# Patient Record
Sex: FEMALE | Race: OTHER
[De-identification: names, ages, dates, MRNs, and addresses within clinical notes are randomized per-mention and may not be internally consistent; named-entity substitution may affect disease eponyms.]

---

## 2017-09-15 ENCOUNTER — HOSPITAL ENCOUNTER (EMERGENCY)
Dept: HOSPITAL 72 - EMR | Age: 57
Discharge: HOME | End: 2017-09-15
Payer: MEDICAID

## 2017-09-15 VITALS — SYSTOLIC BLOOD PRESSURE: 129 MMHG | DIASTOLIC BLOOD PRESSURE: 93 MMHG

## 2017-09-15 VITALS — DIASTOLIC BLOOD PRESSURE: 69 MMHG | SYSTOLIC BLOOD PRESSURE: 118 MMHG

## 2017-09-15 VITALS — BODY MASS INDEX: 26.66 KG/M2 | WEIGHT: 160 LBS | HEIGHT: 65 IN

## 2017-09-15 VITALS — SYSTOLIC BLOOD PRESSURE: 117 MMHG | DIASTOLIC BLOOD PRESSURE: 62 MMHG

## 2017-09-15 DIAGNOSIS — N13.30: Primary | ICD-10-CM

## 2017-09-15 DIAGNOSIS — M47.9: ICD-10-CM

## 2017-09-15 DIAGNOSIS — Z90.710: ICD-10-CM

## 2017-09-15 LAB
ALBUMIN/GLOB SERPL: 1.9 {RATIO} (ref 1–2.7)
ALT SERPL-CCNC: 14 U/L (ref 3–33)
ANION GAP SERPL CALC-SCNC: 13 MMOL/L (ref 5–15)
APPEARANCE UR: CLEAR
AST SERPL-CCNC: 17 U/L (ref 5–40)
BASOPHILS NFR BLD AUTO: 1.3 % (ref 0–2)
CALCIUM SERPL-MCNC: 9.1 MG/DL (ref 8.6–10.2)
CHLORIDE SERPL-SCNC: 100 MEQ/L (ref 98–107)
CO2 SERPL-SCNC: 26 MEQ/L (ref 20–30)
CREAT SERPL-MCNC: 1 MG/DL (ref 0.5–0.9)
EOSINOPHIL NFR BLD AUTO: 1.6 % (ref 0–3)
ERYTHROCYTE [DISTWIDTH] IN BLOOD BY AUTOMATED COUNT: 11.5 % (ref 11.6–14.8)
GFR SERPLBLD BASED ON 1.73 SQ M-ARVRAT: 57.6 ML/MIN (ref 60–?)
GLOBULIN SER-MCNC: 2.2 G/DL
HEMOLYSIS: 4
KETONES UR QL STRIP: NEGATIVE
LEUKOCYTE ESTERASE UR QL STRIP: NEGATIVE
LIPASE SERPL-CCNC: 38 U/L (ref ?–60)
LYMPHOCYTES NFR BLD AUTO: 23.8 % (ref 20–45)
MCH RBC QN AUTO: 32.2 PG (ref 27–31)
MCHC RBC AUTO-ENTMCNC: 34.5 G/DL (ref 32–36)
MCV RBC AUTO: 93 FL (ref 80–99)
MONOCYTES NFR BLD AUTO: 10.6 % (ref 1–10)
NEUTROPHILS NFR BLD AUTO: 62.7 % (ref 45–75)
NITRITE UR QL STRIP: NEGATIVE
PH UR STRIP: 8 [PH] (ref 4.5–8)
PLATELET # BLD: 172 K/UL (ref 150–450)
PMV BLD AUTO: 8.8 FL (ref 6.5–10.1)
POTASSIUM SERPL-SCNC: 3.9 MEQ/L (ref 3.4–4.9)
PROT SERPL-MCNC: 6.5 G/DL (ref 6.6–8.7)
PROT UR QL STRIP: NEGATIVE
RBC # BLD AUTO: 4.36 M/UL (ref 4.2–5.4)
SODIUM SERPL-SCNC: 139 MEQ/L (ref 135–145)
SP GR UR STRIP: 1.01 (ref 1–1.03)
UROBILINOGEN UR-MCNC: NORMAL MG/DL (ref 0–1)
WBC # BLD AUTO: 6.1 K/UL (ref 4.8–10.8)

## 2017-09-15 PROCEDURE — 83690 ASSAY OF LIPASE: CPT

## 2017-09-15 PROCEDURE — 81003 URINALYSIS AUTO W/O SCOPE: CPT

## 2017-09-15 PROCEDURE — 96374 THER/PROPH/DIAG INJ IV PUSH: CPT

## 2017-09-15 PROCEDURE — 80053 COMPREHEN METABOLIC PANEL: CPT

## 2017-09-15 PROCEDURE — 99284 EMERGENCY DEPT VISIT MOD MDM: CPT

## 2017-09-15 PROCEDURE — 74176 CT ABD & PELVIS W/O CONTRAST: CPT

## 2017-09-15 PROCEDURE — 36415 COLL VENOUS BLD VENIPUNCTURE: CPT

## 2017-09-15 PROCEDURE — 85025 COMPLETE CBC W/AUTO DIFF WBC: CPT

## 2017-09-15 PROCEDURE — 96375 TX/PRO/DX INJ NEW DRUG ADDON: CPT

## 2017-09-16 NOTE — DIAGNOSTIC IMAGING REPORT
Indication: Abdominal pain



Technique: Continuous helical transaxial imaging of the abdomen and pelvis was

obtained from the lung bases to the pubic symphysis. No intravenous contrast 

was

administered. Coronal 2-D reformats were also obtained.



Total Dose length Product (DLP):  740 mGycm



CT Dose Index Volume (CTDIvol):   15.2, 0.15 mGy



Comparison: 8/12/16



Findings: There is a persistent dilatation of the left renal pelvis as well as

dilatation of the calyces consistent with hydronephrosis with high 

density

intraluminally, which may be on the basis of recently administered contrast material

or blood. Abrupt transition with a normal caliber ureter again noted at the UPJ.

There is new left perinephric stranding which could be due to obstruction 

or

infection/pyelonephritis. Please correlate clinically. There is no obstructing

stone. The configuration of the dilated renal pelvis and calyces is unchanged from

the last study in 2016.



The right kidney is unremarkable. The urinary bladder is nondistended. Normal

appendix noted. No free fluid or free air identified. Lung bases are clear.

Gallbladder is unremarkable. Uterus is absent. There is narrowing of intervertebral

discs and accompanying endplate osteophyte formation.  Hypertrophied facet joints

also demonstrated.



Impression:



Left perinephric stranding and hydronephrosis secondary to UPJ obstruction 

and/or

pyelonephritis. Note that the hydronephrosis and abrupt transition at the 

UPJ

configuration is unchanged from previous 2016 CT. Development of intraluminal 

high

density may represent blood or recently administered contrast material. 

Clinical

correlation is needed. No obstructing stone identified.



Status post hysterectomy



Spondylosis





The CT scanner at [facility] is accredited by the [employer] College 

of

Radiology and the scans are performed using dose optimization techniques as

appropriate to a performed exam including Automatic Exposure control.

## 2017-09-16 NOTE — EMERGENCY ROOM REPORT
History of Present Illness


General


Chief Complaint:  Back Pain-No Injury


Source:  Patient





Present Illness


HPI


56-year-old female presents ED complaining of flank pain.  States that left 

flank pain started around 12 PM today after she had an MRI with IV contrast 

done as outpatient.  Patient MRI of her head for persistent headaches ordered 

by her PMD.  Patient states right after the IV contrast was administered she 

developed left flank pain.  Pain is throbbing, 8/10, nonradiating.  No other 

aggravating factors.  Denies dysuria or hematuria.  Patient states she's had 

kidney pain in the past and has been there for for the same reason.  Denies any 

other associated symptom


Allergies:  


Coded Allergies:  


     No Known Allergies (Unverified , 12/4/12)





Patient History


Past Medical History:  none


Past Surgical History:  none


Pertinent Family History:  none


Social History:  Denies: smoking, alcohol use, drug use


Pregnant Now:  No


Immunizations:  UTD


Reviewed Nursing Documentation:  PMH: Agreed, PSxH: Agreed





Nursing Documentation-PMH


Hx Cardiac Problems:  Yes





Review of Systems


All Other Systems:  negative except mentioned in HPI





Physical Exam





Vital Signs








  Date Time  Temp Pulse Resp B/P (MAP) Pulse Ox O2 Delivery O2 Flow Rate FiO2


 


9/15/17 19:29 98.4 65 18 125/78 99 Room Air  








Sp02 EP Interpretation:  reviewed, normal


General Appearance:  alert, GCS 15, non-toxic, mild distress


Head:  normocephalic, atraumatic


Eyes:  bilateral eye normal inspection, bilateral eye PERRL


ENT:  hearing grossly normal, normal pharynx, no angioedema, normal voice


Neck:  full range of motion, supple/symm/no masses


Respiratory:  chest non-tender, lungs clear, normal breath sounds, speaking 

full sentences


Cardiovascular #1:  regular rate, rhythm, no edema


Cardiovascular #2:  2+ carotid (R), 2+ carotid (L), 2+ radial (R), 2+ radial (L)

, 2+ dorsalis pedis (R), 2+ dorsalis pedis (L)


Gastrointestinal:  normal bowel sounds, non tender, soft, non-distended, no 

guarding, no rebound


Rectal:  deferred


Genitourinary:  normal inspection, CVA tenderness (L)


Musculoskeletal:  back normal, gait/station normal, normal range of motion, non-

tender


Neurologic:  alert, oriented x3, responsive, motor strength/tone normal, 

sensory intact, speech normal


Psychiatric:  judgement/insight normal, memory normal, mood/affect normal, no 

suicidal/homicidal ideation


Reflexes:  3+ bicep (R), 3+ bicep (L), 3+ tricep (R), 3+ tricep (L), 3+ knee (R)

, 3+ knee (L)


Skin:  normal color, no rash, warm/dry, well hydrated


Lymphatic:  no adenopathy





Medical Decision Making


Diagnostic Impression:  


 Primary Impression:  


 Hydronephrosis of left kidney


 Additional Impression:  


 Flank pain


ER Course


Hospital Course 


56-year-old female presents with left flank pain after contrast administration





Differential diagnosis includes- kidney stone, pyelonephritis, muscle strain





Clinical course


Patient placed on stretcher.  After initial history and physical I ordered labs

, IV fluids, pain medications and CT scan 





Labs - no leukocytosis, electrolytes ok, LFTs normal, UA unremarkable


CT scan shows perinephric stranding around the left kidney consistent with 

contrast administration.  Cannot rule out pyelonephritis





Discussed findings with urologist Dr. Moran; he agrees that with normal 

creatinine and negative UA patient can be discharged.  Recommend followup with 

urology.  Recommend antibiotics and urine culture





I discussed findings with the patient.  She understands the findings





I feel this is a highly complex case requiring extensive working including EKG/

Rhythm strip, Xray/CT/US, Blood/urine lab work, repeat exams while in ED, and 

administration of strong opiates/narcotics for pain control, admission to 

hospital or close patient follow up.  





Diagnosis - hydronephrosis of left kidney, flank pain





Stable and discharged to home with Rx Tylenol #3, Keflex.  Followup with PMD/

urology.  Return to ED if symptoms recur or worsen





Labs








Test


  9/15/17


19:45 9/15/17


19:50


 


Urine Color Pale yellow  


 


Urine Appearance Clear  


 


Urine pH 8 (4.5-8.0)  


 


Urine Specific Gravity


  1.010


(1.005-1.035) 


 


 


Urine Protein


  Negative


(NEGATIVE) 


 


 


Urine Glucose (UA)


  Negative


(NEGATIVE) 


 


 


Urine Ketones


  Negative


(NEGATIVE) 


 


 


Urine Occult Blood


  Negative


(NEGATIVE) 


 


 


Urine Nitrite


  Negative


(NEGATIVE) 


 


 


Urine Bilirubin


  Negative


(NEGATIVE) 


 


 


Urine Urobilinogen


  Normal MG/DL


(0.0-1.0) 


 


 


Urine Leukocyte Esterase


  Negative


(NEGATIVE) 


 


 


White Blood Count


  


  6.1 K/UL


(4.8-10.8)


 


Red Blood Count


  


  4.36 M/UL


(4.20-5.40)


 


Hemoglobin


  


  14.0 G/DL


(12.0-16.0)


 


Hematocrit


  


  40.7 %


(37.0-47.0)


 


Mean Corpuscular Volume  93 FL (80-99) 


 


Mean Corpuscular Hemoglobin


  


  32.2 PG


(27.0-31.0)


 


Mean Corpuscular Hemoglobin


Concent 


  34.5 G/DL


(32.0-36.0)


 


Red Cell Distribution Width


  


  11.5 %


(11.6-14.8)


 


Platelet Count


  


  172 K/UL


(150-450)


 


Mean Platelet Volume


  


  8.8 FL


(6.5-10.1)


 


Neutrophils (%) (Auto)


  


  62.7 %


(45.0-75.0)


 


Lymphocytes (%) (Auto)


  


  23.8 %


(20.0-45.0)


 


Monocytes (%) (Auto)


  


  10.6 %


(1.0-10.0)


 


Eosinophils (%) (Auto)


  


  1.6 %


(0.0-3.0)


 


Basophils (%) (Auto)


  


  1.3 %


(0.0-2.0)


 


Sodium Level


  


  139 mEQ/L


(135-145)


 


Potassium Level


  


  3.9 mEQ/L


(3.4-4.9)


 


Chloride Level


  


  100 mEQ/L


()


 


Carbon Dioxide Level


  


  26 mEQ/L


(20-30)


 


Anion Gap  13 (5-15) 


 


Blood Urea Nitrogen


  


  14 mg/dL


(7-23)


 


Creatinine


  


  1.0 mg/dL


(0.5-0.9)


 


Estimat Glomerular Filtration


Rate 


  57.6 mL/min


(>60)


 


Glucose Level


  


  90 mg/dL


()


 


Calcium Level


  


  9.1 mg/dL


(8.6-10.2)


 


Total Bilirubin


  


  < 0.2 mg/dL


(0.0-1.2)


 


Aspartate Amino Transf


(AST/SGOT) 


  17 U/L (5-40) 


 


 


Alanine Aminotransferase


(ALT/SGPT) 


  14 U/L (3-33) 


 


 


Alkaline Phosphatase


  


  55 U/L


()


 


Total Protein


  


  6.5 g/dL


(6.6-8.7)


 


Albumin


  


  4.3 g/dL


(3.5-5.2)


 


Globulin  2.2 g/dL 


 


Albumin/Globulin Ratio  1.9 (1.0-2.7) 


 


Lipase  38 U/L (< 60) 








CT/MRI/US Diagnostic Results


CT/MRI/US Diagnostic Results :  


   Imaging Test Ordered:  CT A/P


   Impression


perinephric stranding around L kidney c/w contrast administration. cannot rule 

out pyelonephritis





Last Vital Signs








  Date Time  Temp Pulse Resp B/P (MAP) Pulse Ox O2 Delivery O2 Flow Rate FiO2


 


9/15/17 22:35 98.3 65 16 117/62 99 Room Air  








Status:  improved


Disposition:  HOME, SELF-CARE


Condition:  Stable


Scripts


Acetaminophen With Codeine (T#3) (TYLENOL #3 TAB*) Y Tab


1 TAB ORAL Q8H Y for For Pain, #20 TAB


   Prov: MICHAELA SHAFFER M.D.         9/15/17 


Cephalexin* (KEFLEX*) 500 Mg Capsule


500 MG ORAL Q6H, #28 CAP 0 Refills


   Prov: MICHAELA SHAFFER M.D.         9/15/17


Referrals:  


[facility],REFERRING (PCP)











Ld Moran MD


Patient Instructions:  Hydronephrosis











MICHAELA SHAFFER M.D. Sep 16, 2017 14:35

## 2017-09-30 ENCOUNTER — HOSPITAL ENCOUNTER (EMERGENCY)
Dept: HOSPITAL 72 - EMR | Age: 57
Discharge: HOME | End: 2017-09-30
Payer: MEDICAID

## 2017-09-30 VITALS — SYSTOLIC BLOOD PRESSURE: 127 MMHG | DIASTOLIC BLOOD PRESSURE: 87 MMHG

## 2017-09-30 VITALS — WEIGHT: 158 LBS | BODY MASS INDEX: 26.33 KG/M2 | HEIGHT: 65 IN

## 2017-09-30 VITALS — DIASTOLIC BLOOD PRESSURE: 84 MMHG | SYSTOLIC BLOOD PRESSURE: 121 MMHG

## 2017-09-30 DIAGNOSIS — N13.30: Primary | ICD-10-CM

## 2017-09-30 DIAGNOSIS — N12: ICD-10-CM

## 2017-09-30 LAB
ALBUMIN/GLOB SERPL: 2 {RATIO} (ref 1–2.7)
ALT SERPL-CCNC: 12 U/L (ref 3–33)
ANION GAP SERPL CALC-SCNC: 11 MMOL/L (ref 5–15)
APPEARANCE UR: CLEAR
AST SERPL-CCNC: 13 U/L (ref 5–40)
BACTERIA #/AREA URNS HPF: (no result) /HPF
BASOPHILS NFR BLD AUTO: 1 % (ref 0–2)
CALCIUM SERPL-MCNC: 9.1 MG/DL (ref 8.6–10.2)
CHLORIDE SERPL-SCNC: 108 MEQ/L (ref 98–107)
CO2 SERPL-SCNC: 25 MEQ/L (ref 20–30)
CREAT SERPL-MCNC: 1 MG/DL (ref 0.5–0.9)
EOSINOPHIL NFR BLD AUTO: 1.9 % (ref 0–3)
ERYTHROCYTE [DISTWIDTH] IN BLOOD BY AUTOMATED COUNT: 11.8 % (ref 11.6–14.8)
GFR SERPLBLD BASED ON 1.73 SQ M-ARVRAT: 57.6 ML/MIN (ref 60–?)
GLOBULIN SER-MCNC: 2 G/DL
HEMOLYSIS: 2
KETONES UR QL STRIP: NEGATIVE
LEUKOCYTE ESTERASE UR QL STRIP: (no result)
LYMPHOCYTES NFR BLD AUTO: 28.5 % (ref 20–45)
MCH RBC QN AUTO: 31.3 PG (ref 27–31)
MCHC RBC AUTO-ENTMCNC: 33.3 G/DL (ref 32–36)
MCV RBC AUTO: 94 FL (ref 80–99)
MONOCYTES NFR BLD AUTO: 7.9 % (ref 1–10)
NEUTROPHILS NFR BLD AUTO: 60.8 % (ref 45–75)
NITRITE UR QL STRIP: NEGATIVE
PH UR STRIP: 6 [PH] (ref 4.5–8)
PLATELET # BLD: 183 K/UL (ref 150–450)
PMV BLD AUTO: 7.4 FL (ref 6.5–10.1)
POTASSIUM SERPL-SCNC: 3.9 MEQ/L (ref 3.4–4.9)
PROT SERPL-MCNC: 6.1 G/DL (ref 6.6–8.7)
PROT UR QL STRIP: NEGATIVE
RBC # BLD AUTO: 4.29 M/UL (ref 4.2–5.4)
RBC #/AREA URNS HPF: (no result) /HPF (ref 0–2)
SODIUM SERPL-SCNC: 144 MEQ/L (ref 135–145)
SP GR UR STRIP: 1.02 (ref 1–1.03)
SQUAMOUS #/AREA URNS LPF: (no result) /LPF
UROBILINOGEN UR-MCNC: NORMAL MG/DL (ref 0–1)
WBC # BLD AUTO: 7.6 K/UL (ref 4.8–10.8)
WBC #/AREA URNS HPF: (no result) /HPF (ref 0–2)

## 2017-09-30 PROCEDURE — 87086 URINE CULTURE/COLONY COUNT: CPT

## 2017-09-30 PROCEDURE — 36415 COLL VENOUS BLD VENIPUNCTURE: CPT

## 2017-09-30 PROCEDURE — 96374 THER/PROPH/DIAG INJ IV PUSH: CPT

## 2017-09-30 PROCEDURE — 80053 COMPREHEN METABOLIC PANEL: CPT

## 2017-09-30 PROCEDURE — 85025 COMPLETE CBC W/AUTO DIFF WBC: CPT

## 2017-09-30 PROCEDURE — 99284 EMERGENCY DEPT VISIT MOD MDM: CPT

## 2017-09-30 PROCEDURE — 87181 SC STD AGAR DILUTION PER AGT: CPT

## 2017-09-30 PROCEDURE — 96361 HYDRATE IV INFUSION ADD-ON: CPT

## 2017-09-30 PROCEDURE — 81003 URINALYSIS AUTO W/O SCOPE: CPT

## 2017-09-30 NOTE — EMERGENCY ROOM REPORT
History of Present Illness


General


Chief Complaint:  Back Pain-No Injury


Source:  Patient





Present Illness


HPI


56-year-old female with left-sided chronic hydronephrosis presenting with left 

flank pain.  Patient was seen in the emergency room on September 15 after 

getting IV contrast, patient developed left knee pain at that time.  Patient's 

CAT scan showed persistent hydronephrosis on the left side, unchanged from 

prior CT in 2016, there was no obstructing stone.  Patient was diagnosed with 

left hydronephrosis/left pyelonephritis


Patient not complaining of left flank pain, developed since 10 PM last night, 

left flank nonradiating.  No fever no chills no nausea vomiting.  No dysuria or 

hematuria


Allergies:  


Coded Allergies:  


     No Known Allergies (Unverified , 12/4/12)





Patient History


Past Medical History:  see triage record


Past Surgical History:  none


Pertinent Family History:  none


Reviewed Nursing Documentation:  PMH: Agreed, PSxH: Agreed





Nursing Documentation-PMH


Hx Cardiac Problems:  Yes





Review of Systems


All Other Systems:  negative except mentioned in HPI





Physical Exam





Vital Signs








  Date Time  Temp Pulse Resp B/P (MAP) Pulse Ox O2 Delivery O2 Flow Rate FiO2


 


9/30/17 03:36 97.5 66 18 127/87 99 Room Air  








Sp02 EP Interpretation:  reviewed, normal


General Appearance:  normal inspection, well appearing, no apparent distress, 

alert, GCS 15, non-toxic


Head:  normocephalic, atraumatic


Eyes:  bilateral eye normal inspection, bilateral eye PERRL, bilateral eye EOMI


ENT:  normal ENT inspection, normal pharynx, normal voice, moist mucus membranes


Neck:  normal inspection, full range of motion, supple


Respiratory:  normal inspection, lungs clear, normal breath sounds, no 

respiratory distress, no retraction, no wheezing, speaking full sentences, 

chest symmetrical


Cardiovascular #1:  normal inspection, regular rate, rhythm, no edema, normal 

capillary refill


Cardiovascular #2:  2+ radial (R), 2+ radial (L)


Gastrointestinal:  normal inspection, non tender, soft, non-distended, no 

guarding


Genitourinary:  other - Mild left CVA tenderness


Musculoskeletal:  normal inspection, back normal, normal range of motion, non-

tender


Neurologic:  normal inspection, alert, oriented x3, responsive, motor strength/

tone normal, sensory intact, normal gait, speech normal


Psychiatric:  normal inspection, judgement/insight normal, memory normal


Skin:  normal inspection, normal color, no rash, warm/dry, well hydrated, 

normal turgor





Medical Decision Making


Diagnostic Impression:  


 Primary Impression:  


 Hydronephrosis of left kidney


 Additional Impression:  


 Pyelonephritis


ER Course


56-year-old female with left flank pain


History of left-sided hydronephrosis and 2016





DDX:


Chronic left hydronephrosis due to unknown origin


Pyelonephritis/UTI/kidney stone





Plan:


Obtain labs, ua, ucx


IV fluids pain control





ER course:


Patient has remained stable during ED stay.


Bedside renal ultrasound showing left-sided moderate/severe hydronephrosis, 

right side normal


Patient has had prior CTs in the past showing this left-sided moderate to 

severe hydronephrosis


Patient has 15-20 WBCs in urine, mild left-sided CVA tenderness, will 

prescribed Bactrim


Patient states that she feels much better after fluids and pain medicine


Continues to appear nontoxic, vital signs stable


Will discharge





Disposition:


Patient is to be discharged to home.


Prescriptions given are bactrim


Patient is instructed to follow up with their primary care doctor within 5 

days. 


Patient is instructed to follow up with urology within 2 days.


Strict return precautions discussed with patient such as fever, chills, 

worsening/severe pain, nausea, vomiting, which may indicate severe illness. 

Patient verbalizes understanding and agrees with plan. 





Please note that this Emergency Department Report was dictated using [employer] technology software, occasionally this can lead to 

erroneous entry secondary to interpretation by the dictation equipment





Laboratory Tests








Test


  9/30/17


03:40 9/30/17


04:10


 


Urine Color Pale yellow   


 


Urine Appearance Clear   


 


Urine pH 6 (4.5-8.0)   


 


Urine Specific Gravity


  1.025


(1.005-1.035) 


 


 


Urine Protein


  Negative


(NEGATIVE) 


 


 


Urine Glucose (UA)


  Negative


(NEGATIVE) 


 


 


Urine Ketones


  Negative


(NEGATIVE) 


 


 


Urine Occult Blood


  Negative


(NEGATIVE) 


 


 


Urine Nitrite


  Negative


(NEGATIVE) 


 


 


Urine Bilirubin


  Negative


(NEGATIVE) 


 


 


Urine Urobilinogen


  Normal MG/DL


(0.0-1.0) 


 


 


Urine Leukocyte Esterase


  2+ (NEGATIVE)


H 


 


 


Urine RBC


  0-2 /HPF (0 -


2) 


 


 


Urine WBC


  15-20 /HPF (0


- 2)  H 


 


 


Urine Squamous Epithelial


Cells Few /LPF


(NONE/OCC) 


 


 


Urine Bacteria


  Few /HPF


(NONE) 


 


 


White Blood Count


  


  7.6 K/UL


(4.8-10.8)


 


Red Blood Count


  


  4.29 M/UL


(4.20-5.40)


 


Hemoglobin


  


  13.4 G/DL


(12.0-16.0)


 


Hematocrit


  


  40.4 %


(37.0-47.0)


 


Mean Corpuscular Volume  94 FL (80-99)  


 


Mean Corpuscular Hemoglobin


  


  31.3 PG


(27.0-31.0)  H


 


Mean Corpuscular Hemoglobin


Concent 


  33.3 G/DL


(32.0-36.0)


 


Red Cell Distribution Width


  


  11.8 %


(11.6-14.8)


 


Platelet Count


  


  183 K/UL


(150-450)


 


Mean Platelet Volume


  


  7.4 FL


(6.5-10.1)


 


Neutrophils (%) (Auto)


  


  60.8 %


(45.0-75.0)


 


Lymphocytes (%) (Auto)


  


  28.5 %


(20.0-45.0)


 


Monocytes (%) (Auto)


  


  7.9 %


(1.0-10.0)


 


Eosinophils (%) (Auto)


  


  1.9 %


(0.0-3.0)


 


Basophils (%) (Auto)


  


  1.0 %


(0.0-2.0)


 


Sodium Level


  


  144 mEQ/L


(135-145)


 


Potassium Level


  


  3.9 mEQ/L


(3.4-4.9)


 


Chloride Level


  


  108 mEQ/L


()  H


 


Carbon Dioxide Level


  


  25 mEQ/L


(20-30)


 


Anion Gap  11 (5-15)  


 


Blood Urea Nitrogen


  


  26 mg/dL


(7-23)  H


 


Creatinine


  


  1.0 mg/dL


(0.5-0.9)  H


 


Estimate Glomerular


Filtration Rate 


  57.6 mL/min


(>60)


 


Glucose Level


  


  95 mg/dL


()


 


Calcium Level


  


  9.1 mg/dL


(8.6-10.2)


 


Total Bilirubin


  


  0.3 mg/dL


(0.0-1.2)


 


Aspartate Amino Transferase


(AST) 


  13 U/L (5-40)  


 


 


Alanine Aminotransferase (ALT)  12 U/L (3-33)  


 


Alkaline Phosphatase


  


  52 U/L


()


 


Total Protein


  


  6.1 g/dL


(6.6-8.7)  L


 


Albumin


  


  4.1 g/dL


(3.5-5.2)


 


Globulin  2.0 g/dL  


 


Albumin/Globulin Ratio  2.0 (1.0-2.7)  











Last Vital Signs








  Date Time  Temp Pulse Resp B/P (MAP) Pulse Ox O2 Delivery O2 Flow Rate FiO2


 


9/30/17 03:36 97.5 66 18 127/87 99 Room Air  








Disposition:  HOME, SELF-CARE


Condition:  Improved


Scripts


Trimethoprim/Sulfamethoxazole 160/800* (BACTRIM DS TABLET*) 1 Each Tablet


1 TAB ORAL Q12H for 7 Days, #14 TAB 0 Refills


   Prov: Marshall Ba M.D.         9/30/17


Referrals:  


NON PHYSICIAN (PCP)











Marshall Ba M.D. Sep 30, 2017 04:26

## 2018-06-26 ENCOUNTER — HOSPITAL ENCOUNTER (EMERGENCY)
Dept: HOSPITAL 72 - EMR | Age: 58
LOS: 1 days | Discharge: HOME | End: 2018-06-27
Payer: MEDICAID

## 2018-06-26 VITALS — DIASTOLIC BLOOD PRESSURE: 56 MMHG | SYSTOLIC BLOOD PRESSURE: 102 MMHG

## 2018-06-26 VITALS — HEIGHT: 65 IN | BODY MASS INDEX: 26.16 KG/M2 | WEIGHT: 157 LBS

## 2018-06-26 VITALS — DIASTOLIC BLOOD PRESSURE: 57 MMHG | SYSTOLIC BLOOD PRESSURE: 101 MMHG

## 2018-06-26 DIAGNOSIS — N39.0: ICD-10-CM

## 2018-06-26 DIAGNOSIS — N13.30: Primary | ICD-10-CM

## 2018-06-26 LAB
ADD MANUAL DIFF: NO
ANION GAP SERPL CALC-SCNC: 9 MMOL/L (ref 5–15)
APPEARANCE UR: (no result)
APTT PPP: (no result) S
BASOPHILS NFR BLD AUTO: 1.3 % (ref 0–2)
BUN SERPL-MCNC: 21 MG/DL (ref 7–18)
CALCIUM SERPL-MCNC: 8.5 MG/DL (ref 8.5–10.1)
CHLORIDE SERPL-SCNC: 108 MMOL/L (ref 98–107)
CO2 SERPL-SCNC: 27 MMOL/L (ref 21–32)
CREAT SERPL-MCNC: 0.8 MG/DL (ref 0.55–1.3)
EOSINOPHIL NFR BLD AUTO: 3.3 % (ref 0–3)
ERYTHROCYTE [DISTWIDTH] IN BLOOD BY AUTOMATED COUNT: 11.4 % (ref 11.6–14.8)
GLUCOSE UR STRIP-MCNC: NEGATIVE MG/DL
HCT VFR BLD CALC: 39.1 % (ref 37–47)
HGB BLD-MCNC: 13 G/DL (ref 12–16)
KETONES UR QL STRIP: NEGATIVE
LEUKOCYTE ESTERASE UR QL STRIP: (no result)
LYMPHOCYTES NFR BLD AUTO: 45.3 % (ref 20–45)
MCV RBC AUTO: 93 FL (ref 80–99)
MONOCYTES NFR BLD AUTO: 7.2 % (ref 1–10)
NEUTROPHILS NFR BLD AUTO: 42.8 % (ref 45–75)
NITRITE UR QL STRIP: NEGATIVE
PH UR STRIP: 6 [PH] (ref 4.5–8)
PLATELET # BLD: 162 K/UL (ref 150–450)
POTASSIUM SERPL-SCNC: 3.6 MMOL/L (ref 3.5–5.1)
PROT UR QL STRIP: (no result)
RBC # BLD AUTO: 4.19 M/UL (ref 4.2–5.4)
SODIUM SERPL-SCNC: 144 MMOL/L (ref 136–145)
SP GR UR STRIP: 1.02 (ref 1–1.03)
UROBILINOGEN UR-MCNC: 1 MG/DL (ref 0–1)
WBC # BLD AUTO: 6.6 K/UL (ref 4.8–10.8)

## 2018-06-26 PROCEDURE — 99284 EMERGENCY DEPT VISIT MOD MDM: CPT

## 2018-06-26 PROCEDURE — 96360 HYDRATION IV INFUSION INIT: CPT

## 2018-06-26 PROCEDURE — 96375 TX/PRO/DX INJ NEW DRUG ADDON: CPT

## 2018-06-26 PROCEDURE — 85025 COMPLETE CBC W/AUTO DIFF WBC: CPT

## 2018-06-26 PROCEDURE — 36415 COLL VENOUS BLD VENIPUNCTURE: CPT

## 2018-06-26 PROCEDURE — 96365 THER/PROPH/DIAG IV INF INIT: CPT

## 2018-06-26 PROCEDURE — 96374 THER/PROPH/DIAG INJ IV PUSH: CPT

## 2018-06-26 PROCEDURE — 81003 URINALYSIS AUTO W/O SCOPE: CPT

## 2018-06-26 PROCEDURE — 80048 BASIC METABOLIC PNL TOTAL CA: CPT

## 2018-06-26 PROCEDURE — 87086 URINE CULTURE/COLONY COUNT: CPT

## 2018-06-26 NOTE — EMERGENCY ROOM REPORT
History of Present Illness


General


Chief Complaint:  Back Pain-No Injury


Source:  Patient





Present Illness


HPI


This is a 57-year-old female with a history of left hydronephrosis.  She 

scheduled for stent placement/surgery pending authorization.  She presents with 

left flank pain is been ongoing for 2 days.  Worse with movement.  No dysuria 

frequency.  Similar symptom in the past.  No relief with ibuprofen.  No nausea 

no vomiting.  Pain is 9 out of 10.  Denies any hematuria or fever.  She has 

multiple CT scan in the past which is negative for stone but has stenosis and 

hydronephrosis


Allergies:  


Coded Allergies:  


     No Known Allergies (Unverified , 12/4/12)





Patient History


Past Medical History:  see triage record, old chart reviewed


Past Surgical History:  other


Pertinent Family History:  none


Social History:  Denies: smoking


Pregnant Now:  No


Immunizations:  other


Reviewed Nursing Documentation:  PMH: Agreed; PSxH: Agreed





Nursing Documentation-PMH


Hx Cardiac Problems:  Yes





Review of Systems


Eye:  Denies: eye pain, blurred vision


ENT:  Denies: ear pain, nose congestion, throat swelling


Respiratory:  Denies: cough, shortness of breath


Cardiovascular:  Denies: chest pain, palpitations


Gastrointestinal:  Denies: abdominal pain, diarrhea, nausea, vomiting


Genitourinary:  Reports: pain


Musculoskeletal:  Denies: back pain, joint pain


Skin:  Denies: rash


Neurological:  Denies: headache, numbness


Endocrine:  Denies: increased thirst, increased urine


Hematologic/Lymphatic:  Denies: easy bruising


All Other Systems:  negative except mentioned in HPI





Physical Exam





Vital Signs








  Date Time  Temp Pulse Resp B/P (MAP) Pulse Ox O2 Delivery O2 Flow Rate FiO2


 


6/26/18 21:37 98.0 61 18 93/54 96 Room Air  





 98.1       





vitals unremarkable


Sp02 EP Interpretation:  reviewed, normal


General Appearance:  well appearing, no apparent distress, alert


Head:  normocephalic, atraumatic


Eyes:  bilateral eye PERRL, bilateral eye EOMI


ENT:  hearing grossly normal, normal pharynx


Neck:  full range of motion, supple, no meningismus


Respiratory:  chest non-tender, lungs clear, normal breath sounds


Cardiovascular #1:  regular rate, rhythm, no murmur


Gastrointestinal:  normal bowel sounds, non tender, no mass, no organomegaly, 

no bruit, non-distended


Musculoskeletal:  back normal, gait/station normal, normal range of motion


Psychiatric:  mood/affect normal


Skin:  warm/dry





Medical Decision Making


Diagnostic Impression:  


 Primary Impression:  


 Hydronephrosis of left kidney


 Additional Impression:  


 UTI (lower urinary tract infection)


ER Course


Patient with left flank pain.  She does have a mild UTI.  She is increased risk 

for infection secondary to stenosis and hydronephrosis.  No evidence of kidney 

failure.  We'll discharge home.  Her pain is well-controlled now.


Lab Results Impression


labs unremarkable





Last Vital Signs








  Date Time  Temp Pulse Resp B/P (MAP) Pulse Ox O2 Delivery O2 Flow Rate FiO2


 


6/26/18 21:37 98.0 61 18 93/54 96 Room Air  





 98.1       








Status:  improved


Disposition:  HOME, SELF-CARE


Condition:  Stable


Scripts


Cephalexin* (KEFLEX*) 500 Mg Capsule


500 MG ORAL TID, #21 CAP


   Prov: JED LEIJA M.D.         6/26/18 


Hydrocodone/Acetaminophen 5-325* (HYDROCODONE/ACETAMINOPHEN 5-325*) 1 Each 

Tablet


1 TAB ORAL Q6H PRN for For Pain, #15 TAB 0 Refills


   Prov: JED LEIJA M.D.         6/26/18





Additional Instructions:  


follow-up your doctor in 7 days.  Return if symptom worsen.











JED LEIJA M.D. Jun 26, 2018 22:27

## 2018-06-27 ENCOUNTER — HOSPITAL ENCOUNTER (EMERGENCY)
Dept: HOSPITAL 72 - EMR | Age: 58
Discharge: HOME | End: 2018-06-27
Payer: MEDICAID

## 2018-06-27 VITALS — BODY MASS INDEX: 26.16 KG/M2 | WEIGHT: 157 LBS | HEIGHT: 65 IN

## 2018-06-27 VITALS — SYSTOLIC BLOOD PRESSURE: 101 MMHG | DIASTOLIC BLOOD PRESSURE: 57 MMHG

## 2018-06-27 VITALS — SYSTOLIC BLOOD PRESSURE: 125 MMHG | DIASTOLIC BLOOD PRESSURE: 68 MMHG

## 2018-06-27 VITALS — DIASTOLIC BLOOD PRESSURE: 68 MMHG | SYSTOLIC BLOOD PRESSURE: 125 MMHG

## 2018-06-27 DIAGNOSIS — N13.30: Primary | ICD-10-CM

## 2018-06-27 DIAGNOSIS — Z87.442: ICD-10-CM

## 2018-06-27 LAB
ADD MANUAL DIFF: NO
ALBUMIN SERPL-MCNC: 3.8 G/DL (ref 3.4–5)
ALBUMIN/GLOB SERPL: 1.3 {RATIO} (ref 1–2.7)
ALP SERPL-CCNC: 56 U/L (ref 46–116)
ALT SERPL-CCNC: 26 U/L (ref 12–78)
ANION GAP SERPL CALC-SCNC: 7 MMOL/L (ref 5–15)
APPEARANCE UR: CLEAR
APTT PPP: (no result) S
AST SERPL-CCNC: 18 U/L (ref 15–37)
BASOPHILS NFR BLD AUTO: 1.2 % (ref 0–2)
BILIRUB SERPL-MCNC: 0.2 MG/DL (ref 0.2–1)
BUN SERPL-MCNC: 16 MG/DL (ref 7–18)
CALCIUM SERPL-MCNC: 8.7 MG/DL (ref 8.5–10.1)
CHLORIDE SERPL-SCNC: 106 MMOL/L (ref 98–107)
CO2 SERPL-SCNC: 28 MMOL/L (ref 21–32)
CREAT SERPL-MCNC: 1 MG/DL (ref 0.55–1.3)
EOSINOPHIL NFR BLD AUTO: 2.1 % (ref 0–3)
ERYTHROCYTE [DISTWIDTH] IN BLOOD BY AUTOMATED COUNT: 11.5 % (ref 11.6–14.8)
GLOBULIN SER-MCNC: 2.9 G/DL
GLUCOSE UR STRIP-MCNC: NEGATIVE MG/DL
HCT VFR BLD CALC: 41.2 % (ref 37–47)
HGB BLD-MCNC: 13.7 G/DL (ref 12–16)
KETONES UR QL STRIP: NEGATIVE
LEUKOCYTE ESTERASE UR QL STRIP: (no result)
LYMPHOCYTES NFR BLD AUTO: 35.7 % (ref 20–45)
MCV RBC AUTO: 92 FL (ref 80–99)
MONOCYTES NFR BLD AUTO: 8.1 % (ref 1–10)
NEUTROPHILS NFR BLD AUTO: 52.9 % (ref 45–75)
NITRITE UR QL STRIP: NEGATIVE
PH UR STRIP: 7 [PH] (ref 4.5–8)
PLATELET # BLD: 165 K/UL (ref 150–450)
POTASSIUM SERPL-SCNC: 3.9 MMOL/L (ref 3.5–5.1)
PROT UR QL STRIP: NEGATIVE
RBC # BLD AUTO: 4.46 M/UL (ref 4.2–5.4)
SODIUM SERPL-SCNC: 141 MMOL/L (ref 136–145)
SP GR UR STRIP: 1.01 (ref 1–1.03)
UROBILINOGEN UR-MCNC: NORMAL MG/DL (ref 0–1)
WBC # BLD AUTO: 9.6 K/UL (ref 4.8–10.8)

## 2018-06-27 PROCEDURE — 81003 URINALYSIS AUTO W/O SCOPE: CPT

## 2018-06-27 PROCEDURE — 80053 COMPREHEN METABOLIC PANEL: CPT

## 2018-06-27 PROCEDURE — 99284 EMERGENCY DEPT VISIT MOD MDM: CPT

## 2018-06-27 PROCEDURE — 85025 COMPLETE CBC W/AUTO DIFF WBC: CPT

## 2018-06-27 PROCEDURE — 36415 COLL VENOUS BLD VENIPUNCTURE: CPT

## 2018-06-27 PROCEDURE — 83690 ASSAY OF LIPASE: CPT

## 2018-06-27 PROCEDURE — 76770 US EXAM ABDO BACK WALL COMP: CPT

## 2018-06-27 PROCEDURE — 96365 THER/PROPH/DIAG IV INF INIT: CPT

## 2018-06-28 NOTE — DIAGNOSTIC IMAGING REPORT
Indication: Hydronephrosis. Follow-up. Abdominal pain

 

Technique: Grayscale and duplex Doppler imaging of the kidneys performed.

 

Comparison: CT 9/15/2017

 

Findings:

 

There is persistent moderate hydronephrosis in the left kidney demonstrated. This was

noted on 9/15/2017 CT abdomen and pelvis. The right kidney is unremarkable. The left

kidney is enlarged measuring 13.5 cm. Right kidney is 11.3 cm. IVC and bladder appear

unremarkable.

 

IMPRESSION:

 

Moderate left hydronephrosis.

## 2018-06-29 NOTE — EMERGENCY ROOM REPORT
History of Present Illness


General


Chief Complaint:  Pain


Source:  Patient





Present Illness


HPI


Patient is a 57-year-old female who presented after increased flank pain.  

Patient reports having prior history of kidney stones.  Patient recently 

started on antibiotics after a reported infection from renal stones.  Patient 

was noted to have prior history of hydronephrosis on left kidney.The patient 

states that he had been having increased patient states she been having 

increased pain which was not relieved by medications.  Patient had not been 

having any fever or vomiting.The patient reports having previous procedure 

scheduling for management of stone.


Allergies:  


Coded Allergies:  


     No Known Allergies (Unverified , 12/4/12)





Patient History


Last Menstrual Period:  n/a


Reviewed Nursing Documentation:  PMH: Agreed; PSxH: Agreed





Nursing Documentation-PM


Past Medical History:  No History, Except For


Hx Cardiac Problems:  Yes





Review of Systems


All Other Systems:  negative except mentioned in HPI





Physical Exam





Vital Signs








  Date Time  Temp Pulse Resp B/P (MAP) Pulse Ox O2 Delivery O2 Flow Rate FiO2


 


6/27/18 19:37 97.9 55 16 132/78 97 Room Air  





 97.9       








General Appearance:  well appearing, no apparent distress, alert, GCS 15


Head:  normocephalic, atraumatic


ENT:  hearing grossly normal, normal voice


Neck:  full range of motion, supple


Respiratory:  no respiratory distress, speaking full sentences


Cardiovascular #1:  normal inspection


Gastrointestinal:  normal inspection, normal bowel sounds, non tender


Musculoskeletal:  no calf tenderness


Neurologic:  normal gait


Psychiatric:  mood/affect normal


Skin:  no rash





Medical Decision Making


Diagnostic Impression:  


 Primary Impression:  


 Hydronephrosis, left


ER Course


Patient presented for flank pain.  Differential diagnosis included was not 

limited to pneumonia, renal stone, rib fracture, pulmonary embolism, ulcer, 

enteritis, pyelonephritis among others.  Because of complexity of patient's 

case laboratory testing and imaging studies were ordered.


Abdominal ultrasound showed evidence of continued left-sided hydronephrosis.  

Patient was noted to have prior history of stones.  Patient urine appears to be 

less infected.  The patient was offered admission for further evaluation and 

management of stones and she stated she did not want to be hospitalized.  The 

patient is advised to return if she began having worsening pain, persistent 

vomiting or change mind about being admitted.





Labs








Test


  6/27/18


19:50


 


White Blood Count


  9.6 K/UL


(4.8-10.8)


 


Red Blood Count


  4.46 M/UL


(4.20-5.40)


 


Hemoglobin


  13.7 G/DL


(12.0-16.0)


 


Hematocrit


  41.2 %


(37.0-47.0)


 


Mean Corpuscular Volume 92 FL (80-99) 


 


Mean Corpuscular Hemoglobin


  30.7 PG


(27.0-31.0)


 


Mean Corpuscular Hemoglobin


Concent 33.2 G/DL


(32.0-36.0)


 


Red Cell Distribution Width


  11.5 %


(11.6-14.8)


 


Platelet Count


  165 K/UL


(150-450)


 


Mean Platelet Volume


  7.9 FL


(6.5-10.1)


 


Neutrophils (%) (Auto)


  52.9 %


(45.0-75.0)


 


Lymphocytes (%) (Auto)


  35.7 %


(20.0-45.0)


 


Monocytes (%) (Auto)


  8.1 %


(1.0-10.0)


 


Eosinophils (%) (Auto)


  2.1 %


(0.0-3.0)


 


Basophils (%) (Auto)


  1.2 %


(0.0-2.0)


 


Urine Color Pale yellow 


 


Urine Appearance Clear 


 


Urine pH 7 (4.5-8.0) 


 


Urine Specific Gravity


  1.010


(1.005-1.035)


 


Urine Protein


  Negative


(NEGATIVE)


 


Urine Glucose (UA)


  Negative


(NEGATIVE)


 


Urine Ketones


  Negative


(NEGATIVE)


 


Urine Occult Blood


  Negative


(NEGATIVE)


 


Urine Nitrite


  Negative


(NEGATIVE)


 


Urine Bilirubin


  Negative


(NEGATIVE)


 


Urine Urobilinogen


  Normal MG/DL


(0.0-1.0)


 


Urine Leukocyte Esterase 1+ (NEGATIVE) 


 


Urine RBC


  0-2 /HPF (0 -


2)


 


Urine WBC


  2-4 /HPF (0 -


2)


 


Urine Squamous Epithelial


Cells Few /LPF


(NONE/OCC)


 


Urine Bacteria


  Few /HPF


(NONE)


 


Sodium Level


  141 MMOL/L


(136-145)


 


Potassium Level


  3.9 MMOL/L


(3.5-5.1)


 


Chloride Level


  106 MMOL/L


()


 


Carbon Dioxide Level


  28 MMOL/L


(21-32)


 


Anion Gap


  7 mmol/L


(5-15)


 


Blood Urea Nitrogen


  16 mg/dL


(7-18)


 


Creatinine


  1.0 MG/DL


(0.55-1.30)


 


Estimat Glomerular Filtration


Rate 57.4 mL/min


(>60)


 


Glucose Level


  86 MG/DL


()


 


Calcium Level


  8.7 MG/DL


(8.5-10.1)


 


Total Bilirubin


  0.2 MG/DL


(0.2-1.0)


 


Aspartate Amino Transf


(AST/SGOT) 18 U/L (15-37) 


 


 


Alanine Aminotransferase


(ALT/SGPT) 26 U/L (12-78) 


 


 


Alkaline Phosphatase


  56 U/L


()


 


Total Protein


  6.7 G/DL


(6.4-8.2)


 


Albumin


  3.8 G/DL


(3.4-5.0)


 


Globulin 2.9 g/dL 


 


Albumin/Globulin Ratio 1.3 (1.0-2.7) 


 


Lipase


  158 U/L


()











Last Vital Signs








  Date Time  Temp Pulse Resp B/P (MAP) Pulse Ox O2 Delivery O2 Flow Rate FiO2


 


6/27/18 21:43 97.9 62 16 125/68 98 Room Air  





 97.9       








Status:  improved


Disposition:  HOME, SELF-CARE


Condition:  Stable


Referrals:  


[facility],REFERRING (PCP)


Patient Instructions:  Kidney Stones











Gerald Olivo MD Jun 29, 2018 05:39

## 2018-10-17 ENCOUNTER — HOSPITAL ENCOUNTER (EMERGENCY)
Dept: HOSPITAL 72 - EMR | Age: 58
Discharge: HOME | End: 2018-10-17
Payer: MEDICAID

## 2018-10-17 VITALS — SYSTOLIC BLOOD PRESSURE: 146 MMHG | DIASTOLIC BLOOD PRESSURE: 85 MMHG

## 2018-10-17 VITALS — BODY MASS INDEX: 26.66 KG/M2 | WEIGHT: 160 LBS | HEIGHT: 65 IN

## 2018-10-17 DIAGNOSIS — N13.30: ICD-10-CM

## 2018-10-17 DIAGNOSIS — N39.0: ICD-10-CM

## 2018-10-17 DIAGNOSIS — R10.9: Primary | ICD-10-CM

## 2018-10-17 LAB
ADD MANUAL DIFF: NO
ALBUMIN SERPL-MCNC: 3.5 G/DL (ref 3.4–5)
ALBUMIN/GLOB SERPL: 1.2 {RATIO} (ref 1–2.7)
ALP SERPL-CCNC: 65 U/L (ref 46–116)
ALT SERPL-CCNC: 19 U/L (ref 12–78)
ANION GAP SERPL CALC-SCNC: 10 MMOL/L (ref 5–15)
APPEARANCE UR: CLEAR
APTT PPP: (no result) S
AST SERPL-CCNC: 13 U/L (ref 15–37)
BASOPHILS NFR BLD AUTO: 1.6 % (ref 0–2)
BILIRUB SERPL-MCNC: 0.1 MG/DL (ref 0.2–1)
BUN SERPL-MCNC: 18 MG/DL (ref 7–18)
CALCIUM SERPL-MCNC: 9.2 MG/DL (ref 8.5–10.1)
CHLORIDE SERPL-SCNC: 105 MMOL/L (ref 98–107)
CO2 SERPL-SCNC: 25 MMOL/L (ref 21–32)
CREAT SERPL-MCNC: 0.6 MG/DL (ref 0.55–1.3)
EOSINOPHIL NFR BLD AUTO: 1.7 % (ref 0–3)
ERYTHROCYTE [DISTWIDTH] IN BLOOD BY AUTOMATED COUNT: 11 % (ref 11.6–14.8)
GLOBULIN SER-MCNC: 3 G/DL
GLUCOSE UR STRIP-MCNC: NEGATIVE MG/DL
HCT VFR BLD CALC: 38.3 % (ref 37–47)
HGB BLD-MCNC: 13.3 G/DL (ref 12–16)
KETONES UR QL STRIP: NEGATIVE
LEUKOCYTE ESTERASE UR QL STRIP: (no result)
LYMPHOCYTES NFR BLD AUTO: 36.1 % (ref 20–45)
MCV RBC AUTO: 91 FL (ref 80–99)
MONOCYTES NFR BLD AUTO: 8.1 % (ref 1–10)
NEUTROPHILS NFR BLD AUTO: 52.5 % (ref 45–75)
NITRITE UR QL STRIP: NEGATIVE
PH UR STRIP: 6.5 [PH] (ref 4.5–8)
PLATELET # BLD: 168 K/UL (ref 150–450)
POTASSIUM SERPL-SCNC: 4 MMOL/L (ref 3.5–5.1)
PROT UR QL STRIP: NEGATIVE
RBC # BLD AUTO: 4.2 M/UL (ref 4.2–5.4)
SODIUM SERPL-SCNC: 139 MMOL/L (ref 136–145)
SP GR UR STRIP: 1.01 (ref 1–1.03)
UROBILINOGEN UR-MCNC: NORMAL MG/DL (ref 0–1)
WBC # BLD AUTO: 7 K/UL (ref 4.8–10.8)

## 2018-10-17 PROCEDURE — 99284 EMERGENCY DEPT VISIT MOD MDM: CPT

## 2018-10-17 PROCEDURE — 96375 TX/PRO/DX INJ NEW DRUG ADDON: CPT

## 2018-10-17 PROCEDURE — 83690 ASSAY OF LIPASE: CPT

## 2018-10-17 PROCEDURE — 36415 COLL VENOUS BLD VENIPUNCTURE: CPT

## 2018-10-17 PROCEDURE — 96365 THER/PROPH/DIAG IV INF INIT: CPT

## 2018-10-17 PROCEDURE — 96361 HYDRATE IV INFUSION ADD-ON: CPT

## 2018-10-17 PROCEDURE — 80053 COMPREHEN METABOLIC PANEL: CPT

## 2018-10-17 PROCEDURE — 74176 CT ABD & PELVIS W/O CONTRAST: CPT

## 2018-10-17 PROCEDURE — 85025 COMPLETE CBC W/AUTO DIFF WBC: CPT

## 2018-10-17 PROCEDURE — 81003 URINALYSIS AUTO W/O SCOPE: CPT

## 2018-10-17 NOTE — EMERGENCY ROOM REPORT
History of Present Illness


General


Chief Complaint:  Abdominal Pain


Source:  Patient, Medical Record





Present Illness


HPI


Is a 57-year-old female who has a history of left hydronephrosis seen on CT 

scan and ultrasound.  This may be secondary to some type of intrinsic versus 

extrinsic compression.  She had not had surgery yet.  She said that her doctor 

recommended robotic surgery versus open laparotomy.  She could not find a 

doctor to that surgery yet.  She presents with chief complaint of upper quadrant

/left flank pain.  Onset for 2 hours.  Pain is 9 out of 10.  Sharp in nature.  

Nausea but no vomiting.  No fever chills.  No urinary complaint.  No radiation.

  Nothing made it better.  Nothing made it worse.


Allergies:  


Coded Allergies:  


     No Known Allergies (Unverified , 12/4/12)





Patient History


Past Medical History:  see triage record, old chart reviewed


Past Surgical History:  other


Pertinent Family History:  none


Social History:  Denies: smoking


Last Menstrual Period:  2008


Pregnant Now:  No


Para:  3


Immunizations:  other


Reviewed Nursing Documentation:  PMH: Agreed; PSxH: Agreed





Nursing Documentation-PMH


Past Medical History:  No History, Except For


Hx Cardiac Problems:  Yes





Review of Systems


Eye:  Denies: eye pain, blurred vision


ENT:  Denies: ear pain, nose congestion, throat swelling


Respiratory:  Denies: cough, shortness of breath


Cardiovascular:  Denies: chest pain, palpitations


Gastrointestinal:  Reports: abdominal pain; Denies: diarrhea, nausea, vomiting


Musculoskeletal:  Denies: back pain, joint pain


Skin:  Denies: rash


Neurological:  Denies: headache, numbness


Endocrine:  Denies: increased thirst, increased urine


Hematologic/Lymphatic:  Denies: easy bruising


All Other Systems:  negative except mentioned in HPI





Physical Exam





Vital Signs








  Date Time  Temp Pulse Resp B/P (MAP) Pulse Ox O2 Delivery O2 Flow Rate FiO2


 


10/17/18 21:24 97.8 62 16 146/85 99 Room Air  





 97.9       





vitals unremarkable


Sp02 EP Interpretation:  reviewed, normal


General Appearance:  well appearing, no apparent distress, alert


Head:  normocephalic, atraumatic


Eyes:  bilateral eye PERRL, bilateral eye EOMI


ENT:  hearing grossly normal, normal pharynx


Neck:  full range of motion, supple, no meningismus


Respiratory:  chest non-tender, lungs clear, normal breath sounds


Cardiovascular #1:  regular rate, rhythm, no murmur


Gastrointestinal:  normal bowel sounds, no mass, no organomegaly, no bruit, non-

distended, tenderness - upper quadrants, mostly left


Musculoskeletal:  back normal, gait/station normal, normal range of motion


Psychiatric:  mood/affect normal


Skin:  warm/dry





Medical Decision Making


Diagnostic Impression:  


 Primary Impression:  


 Abdominal pain


 Qualified Codes:  R10.10 - Upper abdominal pain, unspecified


 Additional Impressions:  


 UTI (urinary tract infection)


 Qualified Codes:  N30.00 - Acute cystitis without hematuria


 Degenerative disc disease, lumbar


 Degenerative disc disease, thoracic


ER Course


Patient with upper quadrant mostly left upper quadrant pain.  This may be 

secondary to referred pain from her degenerative disc disease.  Could also be 

secondary to her left hydronephrosis.  According to her  she did have a 

stent placed last month.  Was only and therefore a few days and had to be 

removed because of severe pain.  Swelling is improved.  No evidence of an acute 

abdomen.  No obstruction.  We'll discharge home.


Lab Results Impression


labs normal


CT/MRI/US Diagnostic Results


CT/MRI/US Diagnostic Results :  


   Imaging Test Ordered:  CT abdomennd pelvis


   Impression


Read by radiologist.  Slight improvement from prior exam of left 

hydronephrosis.  No acute abdomen.





Last Vital Signs








  Date Time  Temp Pulse Resp B/P (MAP) Pulse Ox O2 Delivery O2 Flow Rate FiO2


 


10/17/18 21:24 97.8 62 16 146/85 99 Room Air  





 97.9       








Status:  improved


Disposition:  HOME, SELF-CARE


Condition:  Stable


Scripts


Cephalexin* (KEFLEX*) 500 Mg Capsule


500 MG ORAL TID, #21 CAP


   Prov: Bryan Lambert MD         10/17/18 


Hydrocodone/Acetaminophen 5-325* (HYDROCODONE/ACETAMINOPHEN 5-325*) 1 Each 

Tablet


1 TAB ORAL Q6H PRN for For Pain, #15 TAB 0 Refills


   Prov: Bryan Lambert MD         10/17/18


Patient Instructions:  Abdominal Pain, Adult





Additional Instructions:  


Follow-up with your [facility] in 2 to 3 days.  Return if symptom worsen.











Bryan Lambert MD Oct 17, 2018 21:35

## 2018-10-18 NOTE — DIAGNOSTIC IMAGING REPORT
Indication: Abdominal pain

 

Technique: Continuous helical transaxial imaging of the abdomen and pelvis was

obtained from the lung bases to the pubic symphysis. No intravenous contrast was

administered. Coronal 2-D reformats were also obtained. Automatic Exposure Control

was utilized.

 

 

Total Dose length Product (DLP):  760.71 mGycm

 

CT Dose Index Volume (CTDIvol):   15.23 mGy

 

Comparison: 9/15/2017 

 

Findings: No ureteral stone identified. Left hydronephrosis with dilated calyces and

a renal pelvis noted once again. There is a extrarenal pelvis in the left kidney

measuring about 3.9 cm. This was also noted on the prior occasion. Contrast or

hemorrhage was noted within this portion of the upper left collecting system on the

prior occasion. This has resolved. The ureter does not appear significantly dilated

on the left side beyond the UPJ. The appendix is normal. Gallbladder is noted. There

is a questionable stone or sludge in the gallbladder. Lung bases are clear.

Diverticula noted in the colon. No evidence of diverticulitis. Urinary bladder is

unremarkable. There is surgical absence of the uterus.

 

IMPRESSION:

 

 Persistent but slightly improved left hydronephrosis and a dilated extrarenal

pelvis. Perinephric stranding and inflammation previously demonstrated has resolved.

Correlate clinically.

 

Normal appendix

 

Other findings unchanged from the last examination.

 

[facility] has communicated the preliminary results to the Emergency

Department.  Their findings are largely concordant with this report.

 

The CT scanner at [facility] is accredited by the [employer] College of

Radiology and the scans are performed using dose optimization techniques as

appropriate to a performed exam including Automatic Exposure control.

## 2019-08-25 ENCOUNTER — HOSPITAL ENCOUNTER (EMERGENCY)
Dept: HOSPITAL 72 - EMR | Age: 59
Discharge: HOME | End: 2019-08-25
Payer: MEDICAID

## 2019-08-25 VITALS — SYSTOLIC BLOOD PRESSURE: 110 MMHG | DIASTOLIC BLOOD PRESSURE: 76 MMHG

## 2019-08-25 VITALS — SYSTOLIC BLOOD PRESSURE: 108 MMHG | DIASTOLIC BLOOD PRESSURE: 72 MMHG

## 2019-08-25 VITALS — WEIGHT: 160 LBS | HEIGHT: 64 IN | BODY MASS INDEX: 27.31 KG/M2

## 2019-08-25 DIAGNOSIS — I51.7: ICD-10-CM

## 2019-08-25 DIAGNOSIS — Z90.710: ICD-10-CM

## 2019-08-25 DIAGNOSIS — R10.9: Primary | ICD-10-CM

## 2019-08-25 DIAGNOSIS — N28.1: ICD-10-CM

## 2019-08-25 LAB
ADD MANUAL DIFF: NO
ALBUMIN SERPL-MCNC: 3.7 G/DL (ref 3.4–5)
ALBUMIN/GLOB SERPL: 1.3 {RATIO} (ref 1–2.7)
ALP SERPL-CCNC: 49 U/L (ref 46–116)
ALT SERPL-CCNC: 21 U/L (ref 12–78)
ANION GAP SERPL CALC-SCNC: 5 MMOL/L (ref 5–15)
APPEARANCE UR: CLEAR
APTT PPP: (no result) S
AST SERPL-CCNC: 13 U/L (ref 15–37)
BASOPHILS NFR BLD AUTO: 1.4 % (ref 0–2)
BILIRUB SERPL-MCNC: 0.5 MG/DL (ref 0.2–1)
BUN SERPL-MCNC: 15 MG/DL (ref 7–18)
CALCIUM SERPL-MCNC: 9 MG/DL (ref 8.5–10.1)
CHLORIDE SERPL-SCNC: 108 MMOL/L (ref 98–107)
CO2 SERPL-SCNC: 28 MMOL/L (ref 21–32)
CREAT SERPL-MCNC: 0.7 MG/DL (ref 0.55–1.3)
EOSINOPHIL NFR BLD AUTO: 1 % (ref 0–3)
ERYTHROCYTE [DISTWIDTH] IN BLOOD BY AUTOMATED COUNT: 11.9 % (ref 11.6–14.8)
GLOBULIN SER-MCNC: 2.8 G/DL
GLUCOSE UR STRIP-MCNC: NEGATIVE MG/DL
HCT VFR BLD CALC: 43.3 % (ref 37–47)
HGB BLD-MCNC: 14.3 G/DL (ref 12–16)
KETONES UR QL STRIP: NEGATIVE
LEUKOCYTE ESTERASE UR QL STRIP: (no result)
LYMPHOCYTES NFR BLD AUTO: 33.7 % (ref 20–45)
MCV RBC AUTO: 95 FL (ref 80–99)
MONOCYTES NFR BLD AUTO: 7.5 % (ref 1–10)
NEUTROPHILS NFR BLD AUTO: 56.3 % (ref 45–75)
NITRITE UR QL STRIP: NEGATIVE
PH UR STRIP: 8 [PH] (ref 4.5–8)
PLATELET # BLD: 196 K/UL (ref 150–450)
POTASSIUM SERPL-SCNC: 3.7 MMOL/L (ref 3.5–5.1)
PROT UR QL STRIP: NEGATIVE
RBC # BLD AUTO: 4.55 M/UL (ref 4.2–5.4)
SODIUM SERPL-SCNC: 141 MMOL/L (ref 136–145)
SP GR UR STRIP: 1.01 (ref 1–1.03)
UROBILINOGEN UR-MCNC: NORMAL MG/DL (ref 0–1)
WBC # BLD AUTO: 7.5 K/UL (ref 4.8–10.8)

## 2019-08-25 PROCEDURE — 81003 URINALYSIS AUTO W/O SCOPE: CPT

## 2019-08-25 PROCEDURE — 71045 X-RAY EXAM CHEST 1 VIEW: CPT

## 2019-08-25 PROCEDURE — 36415 COLL VENOUS BLD VENIPUNCTURE: CPT

## 2019-08-25 PROCEDURE — 83690 ASSAY OF LIPASE: CPT

## 2019-08-25 PROCEDURE — 74177 CT ABD & PELVIS W/CONTRAST: CPT

## 2019-08-25 PROCEDURE — 80053 COMPREHEN METABOLIC PANEL: CPT

## 2019-08-25 PROCEDURE — 85025 COMPLETE CBC W/AUTO DIFF WBC: CPT

## 2019-08-25 PROCEDURE — 93005 ELECTROCARDIOGRAM TRACING: CPT

## 2019-08-25 PROCEDURE — 99284 EMERGENCY DEPT VISIT MOD MDM: CPT

## 2019-08-25 PROCEDURE — 96361 HYDRATE IV INFUSION ADD-ON: CPT

## 2019-08-25 PROCEDURE — 96374 THER/PROPH/DIAG INJ IV PUSH: CPT

## 2019-08-25 NOTE — EMERGENCY ROOM REPORT
History of Present Illness


General


Chief Complaint:  Pain


Source:  Patient, Medical Record





Present Illness


HPI


58 year old female presents with, sharp aching nature started 3 days ago, no 

aggravating or alleviating factors, severity is severe, no fevers no chills no 

pain with urination no abdominal pain, patient states that similar to her 

previous kidney pain she recently had a surgery 8 months ago which alleviated 

all the pain however it has returned.


Allergies:  


Coded Allergies:  


     No Known Allergies (Unverified , 12)





Patient History


Past Medical History:  see triage record


Last Menstrual Period:  N/A


Pregnant Now:  No


:  3


Para:  3


Reviewed Nursing Documentation:  PMH: Agreed; PSxH: Agreed





Nursing Documentation-PMH


Hx Cardiac Problems:  Yes





Review of Systems


All Other Systems:  negative except mentioned in HPI





Physical Exam





Vital Signs








  Date Time  Temp Pulse Resp B/P (MAP) Pulse Ox O2 Delivery O2 Flow Rate FiO2


 


19 10:57 97.9 60 20 102/68 (79) 97 Room Air  








Sp02 EP Interpretation:  reviewed, normal


General Appearance:  well appearing, no apparent distress, alert


Head:  normocephalic, atraumatic


Eyes:  bilateral eye PERRL, bilateral eye EOMI


ENT:  uvula midline, moist mucus membranes


Neck:  supple, thyroid normal, supple/symm/no masses


Respiratory:  lungs clear, no respiratory distress, no retraction, no accessory 

muscle use


Cardiovascular #1:  normal peripheral pulses, regular rate, rhythm, no edema, 

no gallop, no murmur


Gastrointestinal:  non tender, soft, no guarding, no rebound


Genitourinary:  CVA tenderness (L)


Musculoskeletal:  normal inspection


Neurologic:  alert, oriented x3


Psychiatric:  mood/affect normal


Skin:  no rash, warm/dry





Medical Decision Making


Diagnostic Impression:  


 Primary Impression:  


 Flank pain


ER Course


58 year old female presents with left flank pain. hx of hydronephrosis. ddx 

includes diverticulitis, hydronephrosis, kidney stone





Patient with no acute processes, labs negative, pain well controlled with 

toradol.


Fluid rehydration. 


Patient states she will follow-up with her urologist. 





Cures report ran.





Laboratory Tests








Test


  19


11:03 19


11:13


 


Urine Color Pale yellow   


 


Urine Appearance Clear   


 


Urine pH 8 (4.5-8.0)   


 


Urine Specific Gravity


  1.010


(1.005-1.035) 


 


 


Urine Protein


  Negative


(NEGATIVE) 


 


 


Urine Glucose (UA)


  Negative


(NEGATIVE) 


 


 


Urine Ketones


  Negative


(NEGATIVE) 


 


 


Urine Blood


  Negative


(NEGATIVE) 


 


 


Urine Nitrite


  Negative


(NEGATIVE) 


 


 


Urine Bilirubin


  Negative


(NEGATIVE) 


 


 


Urine Urobilinogen


  Normal MG/DL


(0.0-1.0) 


 


 


Urine Leukocyte Esterase


  1+ (NEGATIVE)


H 


 


 


Urine RBC


  0 /HPF (0 - 2)


  


 


 


Urine WBC


  0-2 /HPF (0 -


2) 


 


 


Urine Squamous Epithelial


Cells Occasional


/LPF 


 


 


Urine Bacteria


  Occasional


/HPF (NONE) 


 


 


White Blood Count


  


  7.5 K/UL


(4.8-10.8)


 


Red Blood Count


  


  4.55 M/UL


(4.20-5.40)


 


Hemoglobin


  


  14.3 G/DL


(12.0-16.0)


 


Hematocrit


  


  43.3 %


(37.0-47.0)


 


Mean Corpuscular Volume  95 FL (80-99)  


 


Mean Corpuscular Hemoglobin


  


  31.3 PG


(27.0-31.0)  H


 


Mean Corpuscular Hemoglobin


Concent 


  33.0 G/DL


(32.0-36.0)


 


Red Cell Distribution Width


  


  11.9 %


(11.6-14.8)


 


Platelet Count


  


  196 K/UL


(150-450)


 


Mean Platelet Volume


  


  7.5 FL


(6.5-10.1)


 


Neutrophils (%) (Auto)


  


  56.3 %


(45.0-75.0)


 


Lymphocytes (%) (Auto)


  


  33.7 %


(20.0-45.0)


 


Monocytes (%) (Auto)


  


  7.5 %


(1.0-10.0)


 


Eosinophils (%) (Auto)


  


  1.0 %


(0.0-3.0)


 


Basophils (%) (Auto)


  


  1.4 %


(0.0-2.0)


 


Sodium Level


  


  141 MMOL/L


(136-145)


 


Potassium Level


  


  3.7 MMOL/L


(3.5-5.1)


 


Chloride Level


  


  108 MMOL/L


()  H


 


Carbon Dioxide Level


  


  28 MMOL/L


(21-32)


 


Anion Gap


  


  5 mmol/L


(5-15)


 


Blood Urea Nitrogen


  


  15 mg/dL


(7-18)


 


Creatinine


  


  0.7 MG/DL


(0.55-1.30)


 


Estimate Glomerular


Filtration Rate 


  > 60 mL/min


(>60)


 


Glucose Level


  


  92 MG/DL


()


 


Calcium Level


  


  9.0 MG/DL


(8.5-10.1)


 


Total Bilirubin


  


  0.5 MG/DL


(0.2-1.0)


 


Aspartate Amino Transferase


(AST) 


  13 U/L (15-37)


L


 


Alanine Aminotransferase (ALT)


  


  21 U/L (12-78)


 


 


Alkaline Phosphatase


  


  49 U/L


()


 


Total Protein


  


  6.5 G/DL


(6.4-8.2)


 


Albumin


  


  3.7 G/DL


(3.4-5.0)


 


Globulin  2.8 g/dL  


 


Albumin/Globulin Ratio  1.3 (1.0-2.7)  


 


Lipase


  


  142 U/L


()








EKG Diagnostic Results


EKG Time:  11:25


EP Interpretation:  Sinus bradycardia, rate 50, QTc 404, no acute ST elevations

, left axis dev





Chest X-Ray Diagnostic Results


Chest X-Ray Diagnostic Results :  


   Chest X-Ray Ordered:  Yes


   # of Views/Limited/Complete:  1 View


   Indication:  Other - preop


   EP Interpretation:  Yes


   Interpretation:  no consolidation, no effusion, no pneumothorax, no acute 

cardiopulmonary disease


   Impression:  No acute disease


   Electronically Signed by:  Efraín Vila MD





CT/MRI/US Diagnostic Results


CT/MRI/US Diagnostic Results :  


   Impression


Procedure: CT Abdomen Pelvis w/Contrast





EXAM:


  CT Abdomen and Pelvis With Intravenous Contrast


 


CLINICAL HISTORY:


  ABD PAIN


 


TECHNIQUE:


  Axial computed tomography images of the abdomen and pelvis with 


intravenous contrast.  CTDI is 13.88 mGy and DLP is 680 mGy-cm.  One or 


more of the following dose reduction techniques were used: automated 


exposure control, adjustment of the mA and/or kV according to patient 


size, use of iterative reconstruction technique.


 


COMPARISON:


  No relevant prior studies available.


 


FINDINGS:


  Lung bases:  Unremarkable.


 


 ABDOMEN:


  Liver:  Heterogeneous liver.  Vague poorly focus in the right lobe, 


probably small hemangioma about 9 mm


  Gallbladder and bile ducts:  No calcified stones.  No ductal dilation.


  Pancreas:  Unremarkable.


  Spleen:  Unremarkable.


  Adrenals:  Unremarkable.


  Kidneys and ureters:  Right renal cyst measuring approximately 14 mm.  


Left pelvocaliectasis.


  Stomach and bowel:  No jodi mural thickening.  Nonobstructive bowel 


gas pattern.


 


 PELVIS:


  Appendix:  No findings to suggest acute appendicitis.


  Bladder:  Unremarkable.


  Reproductive:  Hysterectomy.


 


 ABDOMEN and PELVIS:


  Intraperitoneal space:  Unremarkable.


  Bones/joints:  No acute fracture.


  Soft tissues:  Unremarkable.


  Vasculature:  Unremarkable.  No abdominal aortic aneurysm.


  Lymph nodes:  No enlarged lymph nodes.


 


IMPRESSION:     


  No acute inflammatory process.


 














Dictated By: Schoellerman,Manal Mosaad MD   


Electronically Signed By: Schoellerman,Manal Mosaad MD   


Signed Date/Time 19 1219   








CC: Efraín Vila MD





Last Vital Signs








  Date Time  Temp Pulse Resp B/P (MAP) Pulse Ox O2 Delivery O2 Flow Rate FiO2


 


19 11:17 98.1 64 18 108/72 100 Room Air  








Disposition:  HOME, SELF-CARE


Condition:  Stable


Scripts


Hydrocodone Bit/Acetaminophen 5-325* (NORCO 5-325*) 1 Each Tablet


1 TAB ORAL Q6H PRN for For Pain, #12 TAB 0 Refills


   Prov: Efraín Vila MD         19 


Naproxen* (NAPROSYN*) 250 Mg Tablet


250 MG ORAL BID PRN for For Pain, #20 TAB 0 Refills


   Prov: Efraín Vila MD         19


Referrals:  


[facility]


Patient Instructions:  Hydronephrosis





Additional Instructions:  


The patient was provided with discharge instructions, notified to follow-up 

with a primary care doctor and or specialist in the next 24-48 hours, and to 

return to the ED if they have worsening of their symptoms. 





Please note that this report is being documented using DRAGON technology.


This can lead to erroneous entry secondary to incorrect interpretation by the 

dictating instrument. 





FOLLOW-UP WITH YOUR UROLOGIST.











Efraín Vila MD Aug 25, 2019 11:21

## 2019-08-25 NOTE — DIAGNOSTIC IMAGING REPORT
EXAM:

  XR Chest, 1 View

 

CLINICAL HISTORY:

  ABD PAIN

 

TECHNIQUE:

  Frontal view of the chest.

 

COMPARISON:

  No relevant prior studies available.

 

FINDINGS:

  Lungs:  No consolidation.

  Pleural space:  Unremarkable.  No pneumothorax.

  Heart:  Mild cardiomegaly.

  Mediastinum:  Unremarkable.

  Bones/joints:  No acute fracture.

 

IMPRESSION:     

  No acute findings.

## 2019-08-25 NOTE — DIAGNOSTIC IMAGING REPORT
EXAM:

  CT Abdomen and Pelvis With Intravenous Contrast

 

CLINICAL HISTORY:

  ABD PAIN

 

TECHNIQUE:

  Axial computed tomography images of the abdomen and pelvis with 

intravenous contrast.  CTDI is 13.88 mGy and DLP is 680 mGy-cm.  One or 

more of the following dose reduction techniques were used: automated 

exposure control, adjustment of the mA and/or kV according to patient 

size, use of iterative reconstruction technique.

 

COMPARISON:

  No relevant prior studies available.

 

FINDINGS:

  Lung bases:  Unremarkable.

 

 ABDOMEN:

  Liver:  Heterogeneous liver.  Vague poorly focus in the right lobe, 

probably small hemangioma about 9 mm

  Gallbladder and bile ducts:  No calcified stones.  No ductal dilation.

  Pancreas:  Unremarkable.

  Spleen:  Unremarkable.

  Adrenals:  Unremarkable.

  Kidneys and ureters:  Right renal cyst measuring approximately 14 mm.  

Left pelvocaliectasis.

  Stomach and bowel:  No jodi mural thickening.  Nonobstructive bowel 

gas pattern.

 

 PELVIS:

  Appendix:  No findings to suggest acute appendicitis.

  Bladder:  Unremarkable.

  Reproductive:  Hysterectomy.

 

 ABDOMEN and PELVIS:

  Intraperitoneal space:  Unremarkable.

  Bones/joints:  No acute fracture.

  Soft tissues:  Unremarkable.

  Vasculature:  Unremarkable.  No abdominal aortic aneurysm.

  Lymph nodes:  No enlarged lymph nodes.

 

IMPRESSION:     

  No acute inflammatory process.

## 2019-08-25 NOTE — NUR
ED Nurse Note:



PT WALKED IN TO ER TODAY FROM HOME. AOX4. PT C/O LEFT LOWER BACK PAIN, 7/10 X 
3-4 DAYS AGO. PT DENIES ANY DIFFICULTY URINATING, PAINFUL URINATION, OR CHANGES 
IN URINARY FREQUENCY. PT DENIES HEMATURIA, VAGINAL DISCHARGE OR BLEEDING. PT 
DENIES FEVER, NAUSEA, OR VOMITING.

## 2025-08-21 ENCOUNTER — APPOINTMENT (OUTPATIENT)
Dept: URBAN - METROPOLITAN AREA CLINIC 46 | Facility: CLINIC | Age: 65
Setting detail: DERMATOLOGY
End: 2025-08-21

## 2025-08-21 DIAGNOSIS — B86 SCABIES: ICD-10-CM

## 2025-08-21 PROCEDURE — ? ADDITIONAL NOTES

## 2025-08-21 PROCEDURE — ? PRESCRIPTION MEDICATION MANAGEMENT

## 2025-08-21 PROCEDURE — ? COUNSELING

## 2025-08-21 PROCEDURE — ? PRESCRIPTION

## 2025-08-21 RX ORDER — PERMETHRIN 50 MG/G
CREAM TOPICAL QD
Qty: 60 | Refills: 3 | Status: ERX | COMMUNITY
Start: 2025-08-21

## 2025-08-21 RX ADMIN — PERMETHRIN: 50 CREAM TOPICAL at 00:00

## 2025-08-21 ASSESSMENT — LOCATION DETAILED DESCRIPTION DERM
LOCATION DETAILED: LEFT SUPRAPUBIC SKIN
LOCATION DETAILED: LEFT PROXIMAL POSTERIOR THIGH
LOCATION DETAILED: LEFT PROXIMAL POSTERIOR THIGH
LOCATION DETAILED: LEFT SUPRAPUBIC SKIN
LOCATION DETAILED: LEFT LATERAL ABDOMEN
LOCATION DETAILED: RIGHT PROXIMAL POSTERIOR THIGH
LOCATION DETAILED: LEFT MEDIAL BREAST 7-8:00 REGION

## 2025-08-21 ASSESSMENT — LOCATION SIMPLE DESCRIPTION DERM
LOCATION SIMPLE: LEFT POSTERIOR THIGH
LOCATION SIMPLE: LEFT POSTERIOR THIGH
LOCATION SIMPLE: GROIN
LOCATION SIMPLE: ABDOMEN
LOCATION SIMPLE: GROIN
LOCATION SIMPLE: LEFT BREAST
LOCATION SIMPLE: RIGHT POSTERIOR THIGH

## 2025-08-21 ASSESSMENT — LOCATION ZONE DERM
LOCATION ZONE: LEG
LOCATION ZONE: TRUNK
LOCATION ZONE: LEG
LOCATION ZONE: TRUNK